# Patient Record
Sex: FEMALE | Race: WHITE | NOT HISPANIC OR LATINO | Employment: OTHER | URBAN - METROPOLITAN AREA
[De-identification: names, ages, dates, MRNs, and addresses within clinical notes are randomized per-mention and may not be internally consistent; named-entity substitution may affect disease eponyms.]

---

## 2017-07-21 ENCOUNTER — HOSPITAL ENCOUNTER (EMERGENCY)
Facility: MEDICAL CENTER | Age: 52
End: 2017-07-21
Attending: EMERGENCY MEDICINE
Payer: MEDICAID

## 2017-07-21 VITALS
BODY MASS INDEX: 21.78 KG/M2 | DIASTOLIC BLOOD PRESSURE: 73 MMHG | HEART RATE: 79 BPM | WEIGHT: 130.73 LBS | HEIGHT: 65 IN | RESPIRATION RATE: 16 BRPM | SYSTOLIC BLOOD PRESSURE: 113 MMHG | TEMPERATURE: 97.5 F | OXYGEN SATURATION: 97 %

## 2017-07-21 DIAGNOSIS — F15.10 METHAMPHETAMINE ABUSE (HCC): ICD-10-CM

## 2017-07-21 PROCEDURE — 99283 EMERGENCY DEPT VISIT LOW MDM: CPT

## 2017-07-21 ASSESSMENT — PAIN SCALES - GENERAL: PAINLEVEL_OUTOF10: 0

## 2017-07-21 NOTE — ED AVS SNAPSHOT
7/21/2017    Elissa Emmanuel  [address] [place] [address]    Dear Elissa:    [facility] wants to ensure your discharge home is safe and you or your loved ones have had all of your questions answered regarding your care after you leave the hospital.    Below is a list of resources and contact information should you have any questions regarding your hospital stay, follow-up instructions, or active medical symptoms.    Questions or Concerns Regarding… Contact   Medical Questions Related to Your Discharge  (7 days a week, 8am-5pm) Contact a Nurse Care Coordinator   [phone]   Medical Questions Not Related to Your Discharge  (24 hours a day / 7 days a week)  Contact the Nurse Health Line   [phone]    Medications or Discharge Instructions Refer to your discharge packet   or contact your [facility] Primary Care Provider   [phone]   Follow-up Appointment(s) Schedule your appointment via [employer]   or contact Scheduling [phone]   Billing Review your statement via [employer]  or contact Billing [phone]   Medical Records Review your records via [employer]   or contact Medical Records [phone]     You may receive a telephone call within two days of discharge. This call is to make certain you understand your discharge instructions and have the opportunity to have any questions answered. You can also easily access your medical information, test results and upcoming appointments via the [employer] free online health management tool. You can learn more and sign up at [email or web address]. For assistance setting up your [employer] account, please call [phone].    Once again, we want to ensure your discharge home is safe and that you have a clear understanding of any next steps in your care. If you have any questions or concerns, please do not hesitate to contact us, we are here for you. Thank you for choosing [facility] for your healthcare needs.    Sincerely,    Your [facility] Healthcare Team

## 2017-07-21 NOTE — ED PROVIDER NOTES
"ED Provider Note    Scribed for Nathen Sanchez M.D. by Adam Pollack. 7/21/2017  4:08 AM    Primary care provider: Pcp Pt States None  Means of arrival: walk in  History obtained from: patient and nurse  History limited by: none    CHIEF COMPLAINT  Chief Complaint   Patient presents with   • Detox       HPI  Elissa Emmanuel is a 52 y.o. female who presents to the Emergency Department for evaluation of drug use prior to transfer to detox program. Nurse informs that the patient arrived in the ED to obtain a bed at detox facility. Patient admitted to using methamphetamine prior to arrival in the ED. Patient denies suicidal ideation, homicidal ideation.     REVIEW OF SYSTEMS  Pertinent positives include drug use, detox.   Pertinent negatives include no suicidal ideation, homicidal ideation.    E.    PAST MEDICAL HISTORY   has a past medical history of Hypertension and Psychiatric disorder.    SURGICAL HISTORY  patient denies any surgical history    SOCIAL HISTORY  Social History   Substance Use Topics   • Smoking status: Never Smoker    • Smokeless tobacco: Never Used   • Alcohol Use: Yes      Comment: 2 drinks per day      History   Drug Use   • Yes     Comment: meth, marijuana       FAMILY HISTORY  History reviewed. No pertinent family history.    CURRENT MEDICATIONS  Home Medications     Reviewed by Drew Buchanan R.N. (Registered Nurse) on 07/21/17 at 0410  Med List Status: Complete    Medication Last Dose Status    diphenhydrAMINE (BENADRYL) 25 MG capsule 7/17/2016 Active                ALLERGIES  No Known Allergies    PHYSICAL EXAM  VITAL SIGNS: /73 mmHg  Pulse 101  Temp(Src) 36.1 °C (97 °F)  Resp 16  Ht 1.651 m (5' 5\")  Wt 59.3 kg (130 lb 11.7 oz)  BMI 21.76 kg/m2  SpO2 99%    Nursing note and vitals reviewed.  Constitutional: Well-developed and well-nourished. No distress. Pressured speech. increased psychomotor agitation consistent with methamphetamine intoxication.   HENT: Head is " normocephalic and atraumatic. Oropharynx is clear and moist without exudate or erythema.   Eyes: Pupils are equal, round, and reactive to light. Conjunctiva are normal.   Cardiovascular: Tachycardic rate and regular rhythm. No murmur heard. Normal radial pulses.  Pulmonary/Chest: Breath sounds normal. No wheezes or rales.   Abdominal: Soft and non-tender. No distention    Musculoskeletal: Extremities exhibit normal range of motion without edema or tenderness.   Neurological: Awake, alert and oriented to person, place, and time. No focal deficits noted.  Skin: Skin is warm and dry. No rash.   Psychiatric: Pressured speech. increased psychomotor agitation consistent with methamphetamine intoxication.     DIAGNOSTIC STUDIES / PROCEDURES    COURSE & MEDICAL DECISION MAKING  Nursing notes, VS, PMSFHx reviewed in chart.     Review of past medical records shows the patient was last seen in the ED 12/2016 for abdominal pain, UTI, and methamphetamine use.     4:08 AM - Social Work informs that she has been placed in the lounge for comfort. Patient seen and examined in the lounge. She will be allowed to remain in the lounge as long as she is not a problem. Plan confirmed with charge nurse. Social work will attempt to obtain a bed during normal business hours secondary to the patient's benign nature and lack of medical complaints. The differential diagnoses include but are not limited to: methamphetamine abuse     6:16 AM - Patient transferred to Room 24.     9:29 AM - Patient has been accepted at [facility] who will also arrange for transportation.       The patient will return for new or worsening symptoms and is stable at the time of discharge.    DISPOSITION:  Patient will be discharged home in stable condition.    FOLLOW UP:  [facility], Emergency Dept  [address] [facility] [address] [phone]    If symptoms worsen      OUTPATIENT MEDICATIONS:  New Prescriptions    No medications on file            FINAL IMPRESSION  1. Methamphetamine abuse          IAdam (Scribe), am scribing for, and in the presence of, Nathen Sanchez M.D..    Electronically signed by: Adam Pollack (Scribe), 7/21/2017    Nathen GUAJARDO M.D. personally performed the services described in this documentation, as scribed by Adam Pollack in my presence, and it is both accurate and complete.    The note accurately reflects work and decisions made by me.  Nathen Sanchez  7/21/2017  11:04 AM

## 2017-07-21 NOTE — ED NOTES
Pt provided with box meal and coffee.  [facility] states they are going to accept pt and arrange for transportation.

## 2017-07-21 NOTE — ED NOTES
Pt provided with discharge instructions, instructions to establish care with PCP, s/s of when to seek emergency care.  Pt verbalizes understanding.  Pt discharged in good condition with [facility].

## 2017-07-21 NOTE — ED NOTES
Elissa Emmanuel  52 y.o.  female  Chief Complaint   Patient presents with   • Detox     Present to triage c/o detox to alcohol. Last drink yesterday. No tremors noted.

## 2017-07-21 NOTE — ED AVS SNAPSHOT
Wisembly Access Code: [email or web address]  Expires: 8/20/2017  8:58 AM    Your email address is not on file at [employer].  Email Addresses are required for you to sign up for Wisembly, please contact [phone] to verify your personal information and to provide your email address prior to attempting to register for Wisembly.    Elissa Emmanuel  [address] [place] [address]    Wisembly  A secure, online tool to manage your health information     [employer]’s Wisembly® is a secure, online tool that connects you to your personalized health information from the privacy of your home -- day or night - making it very easy for you to manage your healthcare. Once the activation process is completed, you can even access your medical information using the Wisembly chloe, which is available for free in the Apple Chloe store or Google Play store.     To learn more about Wisembly, visit [email or web address]    There are two levels of access available (as shown below):   My Chart Features  [facility] Primary Care Doctor [facility]  Specialists [facility]  Urgent  Care [facility] Primary Care Doctor   Email your healthcare team securely and privately 24/7 X X X    Manage appointments: schedule your next appointment; view details of past/upcoming appointments X      Request prescription refills. X      View recent personal medical records, including lab and immunizations X X X X   View health record, including health history, allergies, medications X X X X   Read reports about your outpatient visits, procedures, consult and ER notes X X X X   See your discharge summary, which is a recap of your hospital and/or ER visit that includes your diagnosis, lab results, and care plan X X  X     How to register for Holairat:  Once your e-mail address has been verified, follow the following steps to sign up for Wisembly.     1. Go to  [email or web address]  2. Click on the Sign Up Now box, which takes you to the New Member Sign Up page. You will  need to provide the following information:  a. Enter your Panopto Access Code exactly as it appears at the top of this page. (You will not need to use this code after you’ve completed the sign-up process. If you do not sign up before the expiration date, you must request a new code.)   b. Enter your date of birth.   c. Enter your home email address.   d. Click Submit, and follow the next screen’s instructions.  3. Create a [employer] Globalt ID. This will be your Panopto login ID and cannot be changed, so think of one that is secure and easy to remember.  4. Create a Panopto password. You can change your password at any time.  5. Enter your Password Reset Question and Answer. This can be used at a later time if you forget your password.   6. Enter your e-mail address. This allows you to receive e-mail notifications when new information is available in Panopto.  7. Click Sign Up. You can now view your health information.    For assistance activating your Panopto account, call [phone]

## 2017-07-21 NOTE — ED NOTES
Seen by ERP. Patient ok to stay in [facility] at this time. Referrals given by alert team.

## 2017-07-21 NOTE — ED AVS SNAPSHOT
Home Care Instructions                                                                                                                Elissa Emmanuel   MRN: [id number]    Department:  [facility], Emergency Dept   Date of Visit:  7/21/2017            [facility], Emergency Dept    [address] [place] [address]    Phone:  [phone]      You were seen by     Nathen Sanchez M.D.      Your Diagnosis Was     Methamphetamine abuse     F15.10       Follow-up Information     1. Follow up with [facility], Emergency Dept.    Specialty:  Emergency Medicine    Why:  If symptoms worsen    Contact information    [address] [place] [address] [phone]      Medication Information     Review all of your home medications and newly ordered medications with your primary doctor and/or pharmacist as soon as possible. Follow medication instructions as directed by your doctor and/or pharmacist.     Please keep your complete medication list with you and share with your physician. Update the information when medications are discontinued, doses are changed, or new medications (including over-the-counter products) are added; and carry medication information at all times in the event of emergency situations.               Medication List      ASK your doctor about these medications        Instructions    Morning Afternoon Evening Bedtime    diphenhydrAMINE 25 MG capsule   Commonly known as:  BENADRYL        Take 1 Cap by mouth every 6 hours as needed.   Dose:  25 mg                                  Discharge Instructions       Stimulant Use Disorder-Methamphetamines  Methamphetamines are one of a group of powerful drugs known as stimulants. Methamphetamine is a form of amphetamine. It is rarely used medically but is often misused because of the effects it produces. These effects include:  · A feeling of extreme pleasure (euphoria).  · Alertness.  · High  "energy.  · Increased sexuality.  Common street names for methamphetamine are meth, crystal, ice, glass, and chalk. This drug can be taken by mouth, smoked, snorted, or dissolved in water and injected.  Stimulants are addictive because they activate regions of the brain that are responsible for producing both the pleasurable sensation of \"reward\" and psychological dependence. Together, these actions account for loss of control and the rapid development of drug dependence. This means the user will become ill without the drug (withdrawal) and will need to keep using it to function.   Stimulant use disorder is use of stimulants that disrupts your daily life. It disrupts relationships with family and friends and how you do your job. Methamphetamine increases blood pressure and heart rate. Use can cause a heart attack or stroke. Methamphetamine can also cause death from irregular heart rate or seizures.  SIGNS AND SYMPTOMS   Signs and symptoms of stimulant use disorder with methamphetamine include:  · Use of methamphetamines in larger amounts or over a longer period than intended.  · Unsuccessful attempts to cut down or control methamphetamine use.  · A lot of time spent obtaining, using, or recovering from the effects of methamphetamines.  · A strong desire or urge to use methamphetamines (craving).  · Continued use of methamphetamines in spite of major problems at work, school, or home because of use.  · Continued use of methamphetamines in spite of relationship problems because of use.  · Giving up or cutting down on important life activities because of use of methamphetamines.  · Use of methamphetamines over and over in situations when it is physically hazardous, such as driving a car.  · Continued use of methamphetamines in spite of a physical problem that is likely related to use. Physical problems can include:  ¨ Extreme weight loss.  ¨ Malnutrition.  ¨ Jaw clenching.  ¨ Severe dental problems (meth mouth).  ¨ Lung " problems (due to smoking).  ¨ Skin sores (due to scratching).  ¨ Infections such as human immunodeficiency virus (HIV) and hepatitis (from injecting methamphetamines).  · Continued use of methamphetamines in spite of a mental problem that is likely related to use. Mental problems can include:  ¨ Memory problems.  ¨ Schizophrenia-like symptoms.  ¨ Depression.  ¨ Bipolar mood swings.  ¨ Violent behavior.  ¨ Anxiety.  ¨ Sleep problems.  · Need to use more and more methamphetamines to get the same effect, or lessened effect over time with use of the same amount (tolerance).  · Having withdrawal symptoms when methamphetamine use is stopped, or using methamphetamines to reduce or avoid withdrawal symptoms. Withdrawal symptoms include:  ¨ Depressed or irritable mood.  ¨ Low energy or restlessness.  ¨ Bad dreams.  ¨ Too little or too much sleep.  ¨ Increased appetite.  DIAGNOSIS  Stimulant use disorder is diagnosed by your health care provider. You may be asked questions about your methamphetamine use and how it affects your life. A physical exam may be done. A drug screen may be ordered. You may be referred to a mental health professional. The diagnosis of stimulant use disorder requires at least two symptoms within 12 months. The type of stimulant use disorder depends on the number of symptoms you have. The type may be:  · Mild. Two or three signs and symptoms.  · Moderate. Four or five signs and symptoms.  · Severe. Six or more signs and symptoms.  TREATMENT   There are two types of treatment:   · Short-term (urgent) medical treatment. This helps to preserve life and prevent or minimize damage from the physical or mental problems related to methamphetamine use.    · Long-term substance abuse treatment. This focuses on recovery from use disorder. It is provided by mental health professionals who have training in substance use disorders. It is usually a combination of counseling, support groups, and nonaddictive medicines  that can reduce cravings or block the effects of methamphetamine.  HOME CARE INSTRUCTIONS   · Take medicines only as directed by your health care provider.  · Identify the people and activities that trigger your methamphetamine use and avoid them.  · Keep all follow-up visits as directed by your health care provider.  · Brush and floss your teeth every day. Do this 2 times a day if you can. Also use an antibacterial mouthwash.  · Avoid sugary drinks.  · Do not smoke.  SEEK MEDICAL CARE IF:  · Your symptoms get worse or you relapse.  · You are not able to take medicines as directed.    SEEK IMMEDIATE MEDICAL CARE IF:   · You have serious thoughts about hurting yourself or others.  · You have a seizure, chest pain, sudden weakness, or loss of speech or vision.  FOR MORE INFORMATION  · National [place] on Drug Abuse: www.drugabuse.gov  · Substance Abuse and Mental Health Services Administration: www.samhsa.gov     This information is not intended to replace advice given to you by your health care provider. Make sure you discuss any questions you have with your health care provider.     Document Released: 08/23/2005 Document Revised: 01/08/2016 Document Reviewed: 12/31/2014  Elsevier Interactive Patient Education ©2016 [employer]            Patient Information     Patient Information    Following emergency treatment: all patient requiring follow-up care must return either to a private physician or a clinic if your condition worsens before you are able to obtain further medical attention, please return to the emergency room.     Billing Information    [facility] we work to make the billing process streamlined for our patients.  Our Representatives are here to answer any questions you may have regarding your hospital bill.  If you have insurance coverage and have supplied your insurance information to us, we will submit a claim to your insurer on your behalf.  Should you have any questions regarding your bill,  we can be reached online or by phone as follows:  Online: You are able pay your bills online or live chat with our representatives about any billing questions you may have. We are here to help Monday - Friday from 8:00am to 7:30pm and 9:00am - 12:00pm on Saturdays.  Please visit [email or web address]  for more information.   Phone:  [phone] or [phone]    Please note that your emergency physician, surgeon, pathologist, radiologist, anesthesiologist, and other specialists are not employed by [facility] and will therefore bill separately for their services.  Please contact them directly for any questions concerning their bills at the numbers below:     Emergency Physician Services:  [phone]  [facility]:  [phone]  [facility]:  [phone]  [facility]:  [phone]    1. Your final bill may vary from the amount quoted upon discharge if all procedures are not complete at that time, or if your doctor has additional procedures of which we are not aware. You will receive an additional bill if you return to the Emergency Department at [facility] for suture removal regardless of the facility of which the sutures were placed.     2. Please arrange for settlement of this account at the emergency registration.    3. All self-pay accounts are due in full at the time of treatment.  If you are unable to meet this obligation then payment is expected within 4-5 days.     4. If you have had radiology studies (CT, X-ray, Ultrasound, MRI), you have received a preliminary result during your emergency department visit. Please contact the radiology department [phone] to receive a copy of your final result. Please discuss the Final result with your primary physician or with the follow up physician provided.     Crisis Hotline:  [place] Crisis Hotline:  7-707-KJFARIA or 1-333.351.8752  [place] Crisis Hotline:    1-296.410.3862 or  173.401.9738         ED Discharge Follow Up Questions    1. In order to provide you with very good care, we would like to follow up with a phone call in the next few days.  May we have your permission to contact you?     YES /  NO    2. What is the best phone number to call you? (       )_____-__________    3. What is the best time to call you?      Morning  /  Afternoon  /  Evening                   Patient Signature:  ____________________________________________________________    Date:  ____________________________________________________________      Your appointments     Sep 28, 2017  1:00 PM   New Patient with Daniel Ku M.D.   [facility] ([address])    [address] [place] [address], [address]  [facility] [address]   [phone]

## 2017-07-21 NOTE — DISCHARGE PLANNING
52y female presents with co of etoh and meth abuse. She denies any si, hi or active psychosis. However she appears to under the influence of meth with pressured, tangential speech, hypo manic behavior. [facility] but beds are full at this time. plan to observe and monitor [facility] either [facility]( [facility]) or [facility] intervention this am.

## 2017-07-21 NOTE — ED NOTES
Assumed care of pt. Per Dr. Sanchez per is ok to rest for now, ok to not be monitored at this time, [facility] is arranging for pt to go to [facility] in morning .

## 2018-05-04 ENCOUNTER — HOSPITAL ENCOUNTER (EMERGENCY)
Facility: MEDICAL CENTER | Age: 53
End: 2018-05-04
Attending: EMERGENCY MEDICINE
Payer: MEDICAID

## 2018-05-04 VITALS
TEMPERATURE: 98.4 F | HEIGHT: 63 IN | HEART RATE: 77 BPM | SYSTOLIC BLOOD PRESSURE: 125 MMHG | DIASTOLIC BLOOD PRESSURE: 87 MMHG | OXYGEN SATURATION: 98 % | WEIGHT: 130 LBS | RESPIRATION RATE: 14 BRPM | BODY MASS INDEX: 23.04 KG/M2

## 2018-05-04 DIAGNOSIS — F10.10 ALCOHOL ABUSE: ICD-10-CM

## 2018-05-04 DIAGNOSIS — F15.10 METHAMPHETAMINE ABUSE (HCC): ICD-10-CM

## 2018-05-04 PROCEDURE — 99284 EMERGENCY DEPT VISIT MOD MDM: CPT

## 2018-05-04 NOTE — ED TRIAGE NOTES
"Chief Complaint   Patient presents with   • Detox     from Alcohol and Meth     Last use yesterday. Pt wants to use [facility].   /87   Pulse 90   Temp 37 °C (98.6 °F)   Resp 14   Ht 1.6 m (5' 3\")   Wt 59 kg (130 lb)   SpO2 98%   BMI 23.03 kg/m²   Pt placed back in lobby, educated on triage process, and told to inform staff of any change in condition.     "

## 2018-05-04 NOTE — ED PROVIDER NOTES
"ED Provider Note    CHIEF COMPLAINT  Chief Complaint   Patient presents with   • Detox     from Alcohol and Meth       [facility]  Elissa Emmanuel is a 53 y.o. female who presents to the ER requesting referral to a detox center for methamphetamine and alcohol.  The patient states she's been abusing methamphetamine and alcohol.  She's been doing so off and on for years.  She does IV methamphetamine.  She is having some problem with the courts and wants to be referred to an inpatient detox center.    She denies any acute medical problems.  This time.  No chest pain or cough, shortness of breath, fevers or chills.  Does not feel sick.  Denies any suicidal ideation.  Denies any homicidal ideation.  States she gets angry from time to time.  Otherwise any other acute concerns or complaints.    REVIEW OF SYSTEMS  See HPI for further details. All other systems are negative.    PAST MEDICAL HISTORY  Past Medical History:   Diagnosis Date   • Hypertension    • Psychiatric disorder        FAMILY HISTORY  No family history on file.    SOCIAL HISTORY  Social History     Social History   • Marital status: Single     Spouse name: N/A   • Number of children: N/A   • Years of education: N/A     Social History Main Topics   • Smoking status: Never Smoker   • Smokeless tobacco: Never Used   • Alcohol use Yes      Comment: 2 drinks per day   • Drug use: Yes      Comment: meth, marijuana   • Sexual activity: Not on file     Other Topics Concern   • Not on file     Social History Narrative   • No narrative on file       SURGICAL HISTORY  No past surgical history on file.    CURRENT MEDICATIONS  Home Medications    **Home medications have not yet been reviewed for this encounter**     Aches, over-the-counter allergy medicines including Benadryl.    ALLERGIES  No Known Allergies    PHYSICAL EXAM  VITAL SIGNS: /87   Pulse 90   Temp 37 °C (98.6 °F)   Resp 14   Ht 1.6 m (5' 3\")   Wt 59 kg (130 lb)   SpO2 98%   BMI 23.03 kg/m²  "   Constitutional: Somnolent but wakes up easily.  Answers questions and follows commands.  Very disorganized.    HENT: Normocephalic, Atraumatic, Bilateral external ears normal, Oropharynx moist, No oral exudates, Nose normal.   Eyes: PERRL, EOMI, Conjunctiva normal, No discharge.     Cardiovascular: Normal heart rate, Normal rhythm, No murmurs, No rubs, No gallops.   Thorax & Lungs: Normal breath sounds, No respiratory distress, No wheezing  Abdomen: Bowel sounds normal, Soft, No tenderness  Skin: Warm, Dry, No erythema, No rash.   Musculoskeletal: Good range of motion in all major joints.   Neurologic: Alert, No focal deficits noted.   Psychiatric: Affect normal, although she is disoriented.  She denies suicidal or homicidal ideation.  She has a difficult time sitting still and she is very non-directable.  I think this is sequelae of chronic methamphetamine abuse.    COURSE & MEDICAL DECISION MAKING  Pertinent Labs & Imaging studies reviewed. (See chart for details)  Patient presents requesting help for detox.  I had social work see her.  She does not meet any criteria to be held against her will.  She is not disoriented.  She is afebrile.  Although she is abusing substances and has chronic sequelae of methamphetamine abuse, she does not appear particularly disorganized.  She has no medical problems or complaints.  She is not suicidal.     .   has provided her with resources.  She seems stable for discharge.  She is counseled to stop using methamphetamine.    The patient was noted to have elevated blood pressure while in the ER and was counseled to see their doctor within one wee to have this rechecked.      [address] [address] [phone]  Schedule an appointment as soon as possible for a visit in 2 days        FINAL IMPRESSION  1. Alcohol abuse    2. Methamphetamine abuse          2.   3.         Electronically signed by: Jozef Centeno  5/4/2018 11:49 AM

## 2018-05-04 NOTE — DISCHARGE PLANNING
Medical Social Work    MSW spoke to pt regarding options for detox. At this due to her insurance, no place can accept pt for detox. MSW called and spoke with [facility] [place]. They are full. [facility] does not take pt's insurance and [facility] has closed.     Pt started mumbling about other places she wants to go. Pt stated he was at [facility] and has a  she contacts. MSW tried to redirect pt again. Pt continues to to have scattered thoughts and MSW was unable to interview pt appropriately.    MSW can come back and speak with pt when she has cleared a little more. MSW left resources in pt's chart for substance abuse and [facility] to follow-up with.

## 2018-05-04 NOTE — ED NOTES
Pt self ambulatory to room. Agree with triage RN assessment.     [place] 1 Fall Risk Assessment completed and in patient chart. Appropriate interventions in place.

## 2018-05-04 NOTE — ED NOTES
Discharge instructions given. All questions answered. [facility] with [facility]. Pt verbalized understanding.

## 2018-10-25 ENCOUNTER — APPOINTMENT (OUTPATIENT)
Dept: RADIOLOGY | Facility: MEDICAL CENTER | Age: 53
End: 2018-10-25
Attending: PHYSICIAN ASSISTANT
Payer: MEDICAID

## 2018-12-05 ENCOUNTER — HOSPITAL ENCOUNTER (EMERGENCY)
Facility: MEDICAL CENTER | Age: 53
End: 2018-12-05
Attending: EMERGENCY MEDICINE
Payer: MEDICAID

## 2018-12-05 VITALS
SYSTOLIC BLOOD PRESSURE: 134 MMHG | HEART RATE: 80 BPM | WEIGHT: 160.94 LBS | RESPIRATION RATE: 18 BRPM | BODY MASS INDEX: 25.86 KG/M2 | HEIGHT: 66 IN | DIASTOLIC BLOOD PRESSURE: 85 MMHG | OXYGEN SATURATION: 97 % | TEMPERATURE: 97.5 F

## 2018-12-05 DIAGNOSIS — F15.10 METHAMPHETAMINE ABUSE (HCC): ICD-10-CM

## 2018-12-05 DIAGNOSIS — L30.9 DERMATITIS: ICD-10-CM

## 2018-12-05 PROCEDURE — 700102 HCHG RX REV CODE 250 W/ 637 OVERRIDE(OP): Performed by: EMERGENCY MEDICINE

## 2018-12-05 PROCEDURE — 99284 EMERGENCY DEPT VISIT MOD MDM: CPT

## 2018-12-05 PROCEDURE — 99283 EMERGENCY DEPT VISIT LOW MDM: CPT

## 2018-12-05 PROCEDURE — A9270 NON-COVERED ITEM OR SERVICE: HCPCS | Performed by: EMERGENCY MEDICINE

## 2018-12-05 RX ORDER — DIPHENHYDRAMINE HCL 25 MG
50 TABLET ORAL ONCE
Status: COMPLETED | OUTPATIENT
Start: 2018-12-05 | End: 2018-12-05

## 2018-12-05 RX ADMIN — DIPHENHYDRAMINE HCL 50 MG: 25 TABLET ORAL at 22:10

## 2018-12-05 ASSESSMENT — PAIN SCALES - GENERAL: PAINLEVEL_OUTOF10: 0

## 2018-12-06 ENCOUNTER — HOSPITAL ENCOUNTER (OUTPATIENT)
Dept: HOSPITAL 8 - ED | Age: 53
Setting detail: OBSERVATION
LOS: 1 days | Discharge: PSYCHIATRIC OR SUBSTANCE USE FACILITY | End: 2018-12-07
Attending: INTERNAL MEDICINE | Admitting: INTERNAL MEDICINE
Payer: MEDICAID

## 2018-12-06 VITALS — DIASTOLIC BLOOD PRESSURE: 74 MMHG | SYSTOLIC BLOOD PRESSURE: 123 MMHG

## 2018-12-06 VITALS — BODY MASS INDEX: 26.88 KG/M2 | HEIGHT: 65 IN | WEIGHT: 161.33 LBS

## 2018-12-06 DIAGNOSIS — F29: ICD-10-CM

## 2018-12-06 DIAGNOSIS — Z91.83: ICD-10-CM

## 2018-12-06 DIAGNOSIS — R45.850: Primary | ICD-10-CM

## 2018-12-06 DIAGNOSIS — R45.851: ICD-10-CM

## 2018-12-06 DIAGNOSIS — J44.9: ICD-10-CM

## 2018-12-06 DIAGNOSIS — Z59.0: ICD-10-CM

## 2018-12-06 LAB
ALBUMIN SERPL-MCNC: 3.4 G/DL (ref 3.4–5)
ALP SERPL-CCNC: 112 U/L (ref 45–117)
ALT SERPL-CCNC: 48 U/L (ref 12–78)
ANION GAP SERPL CALC-SCNC: 8 MMOL/L (ref 5–15)
APAP SERPL-MCNC: < 2 MCG/ML (ref 10–30)
BASOPHILS # BLD AUTO: 0.02 X10^3/UL (ref 0–0.1)
BASOPHILS NFR BLD AUTO: 0 % (ref 0–1)
BILIRUB SERPL-MCNC: 0.5 MG/DL (ref 0.2–1)
CALCIUM SERPL-MCNC: 8 MG/DL (ref 8.5–10.1)
CHLORIDE SERPL-SCNC: 108 MMOL/L (ref 98–107)
CREAT SERPL-MCNC: 0.63 MG/DL (ref 0.55–1.02)
EOSINOPHIL # BLD AUTO: 0.21 X10^3/UL (ref 0–0.4)
EOSINOPHIL NFR BLD AUTO: 3 % (ref 1–7)
ERYTHROCYTE [DISTWIDTH] IN BLOOD BY AUTOMATED COUNT: 13.9 % (ref 9.6–15.2)
LYMPHOCYTES # BLD AUTO: 2.38 X10^3/UL (ref 1–3.4)
LYMPHOCYTES NFR BLD AUTO: 38 % (ref 22–44)
MCH RBC QN AUTO: 27.2 PG (ref 27–34.8)
MCHC RBC AUTO-ENTMCNC: 33.5 G/DL (ref 32.4–35.8)
MCV RBC AUTO: 81.3 FL (ref 80–100)
MD: NO
MONOCYTES # BLD AUTO: 0.68 X10^3/UL (ref 0.2–0.8)
MONOCYTES NFR BLD AUTO: 11 % (ref 2–9)
NEUTROPHILS # BLD AUTO: 2.93 X10^3/UL (ref 1.8–6.8)
NEUTROPHILS NFR BLD AUTO: 47 % (ref 42–75)
PLATELET # BLD AUTO: 241 X10^3/UL (ref 130–400)
PMV BLD AUTO: 6.9 FL (ref 7.4–10.4)
PROT SERPL-MCNC: 6.8 G/DL (ref 6.4–8.2)
RBC # BLD AUTO: 4.25 X10^6/UL (ref 3.82–5.3)
VANCOMYCIN TROUGH SERPL-MCNC: < 1.7 MG/DL (ref 2.8–20)

## 2018-12-06 PROCEDURE — 99284 EMERGENCY DEPT VISIT MOD MDM: CPT

## 2018-12-06 PROCEDURE — 36415 COLL VENOUS BLD VENIPUNCTURE: CPT

## 2018-12-06 PROCEDURE — 80053 COMPREHEN METABOLIC PANEL: CPT

## 2018-12-06 PROCEDURE — G0378 HOSPITAL OBSERVATION PER HR: HCPCS

## 2018-12-06 PROCEDURE — 80329 ANALGESICS NON-OPIOID 1 OR 2: CPT

## 2018-12-06 PROCEDURE — 80307 DRUG TEST PRSMV CHEM ANLYZR: CPT

## 2018-12-06 PROCEDURE — G0480 DRUG TEST DEF 1-7 CLASSES: HCPCS

## 2018-12-06 PROCEDURE — 84703 CHORIONIC GONADOTROPIN ASSAY: CPT

## 2018-12-06 PROCEDURE — 85025 COMPLETE CBC W/AUTO DIFF WBC: CPT

## 2018-12-06 SDOH — ECONOMIC STABILITY - HOUSING INSECURITY: HOMELESSNESS: Z59.0

## 2018-12-06 NOTE — ED TRIAGE NOTES
"Chief Complaint   Patient presents with   • Rash     Pt ambulatory to triage with above complaint.  Pt small rash on back.  Pt states she has lost her medications last week and needs them.  Pt speaking tangentially and rambling.  Pt states she was 31 days sober and smoked meth Sunday and Monday.  Pt unable to sit still or answer questions clearly in triage.  Pt states \"I think I need a breathing treatment.\" Pt speaking continually in triage.     Pt returned to lobby, educated on triage process, and to inform staff of any changes or concerns.    Blood pressure 134/85, pulse (!) 58, temperature 36.4 °C (97.5 °F), temperature source Temporal, resp. rate 18, height 1.676 m (5' 6\"), weight 73 kg (160 lb 15 oz), SpO2 97 %.      "

## 2018-12-06 NOTE — ED PROVIDER NOTES
"ED Provider Note    CHIEF COMPLAINT  Chief Complaint   Patient presents with   • Rash       HPI  Elissa Emmanuel is a 53 y.o. female who presents with a report of having a rash with itching for the last few days.  She does have a history of psychiatric disorder secondary to methamphetamine abuse.  She presents appearing that she may be intoxicated on methamphetamine as well.  Denies any fever, chills, sweats, bedbugs, recent exposure to scabies, immunocompromise state    REVIEW OF SYSTEMS  See HPI for further details. All other systems are negative.  Patient is a poor historian    PAST MEDICAL HISTORY  Past Medical History:   Diagnosis Date   • Hypertension    • Psychiatric disorder        FAMILY HISTORY  History reviewed. No pertinent family history.    SOCIAL HISTORY   reports that she has been smoking.  She has been smoking about 0.50 packs per day. She has never used smokeless tobacco. She reports that she drinks alcohol. She reports that she uses drugs.    SURGICAL HISTORY  History reviewed. No pertinent surgical history.    CURRENT MEDICATIONS  Home Medications     Reviewed by Prem Bhatti R.N. (Registered Nurse) on 12/05/18 at [facility]  Med List Status: Not Addressed   Medication Last Dose Status        Patient Philip Taking any Medications                       ALLERGIES  No Known Allergies    PHYSICAL EXAM  VITAL SIGNS: /85   Pulse (!) 58   Temp 36.4 °C (97.5 °F) (Temporal)   Resp 18   Ht 1.676 m (5' 6\")   Wt 73 kg (160 lb 15 oz)   SpO2 97%   BMI 25.98 kg/m²    Constitutional: Well developed, Well nourished, No acute distress, Non-toxic appearance.   HENT: Normocephalic, Atraumatic, Bilateral external ears normal, Oropharynx is clear mucous membranes are moist. No oral exudates or nasal discharge.   Eyes: Pupils are equal round and reactive, EOMI, Conjunctiva normal, No discharge.   Neck: Normal range of motion, No tenderness, Supple, No stridor. No meningismus.  Lymphatic: No " lymphadenopathy noted.   Cardiovascular: Regular rate and rhythm without murmur rub or gallop.  Thorax & Lungs: Clear breath sounds bilaterally without wheezes, rhonchi or rales. There is no chest wall tenderness.   Abdomen: Soft non-tender non-distended. There is no rebound or guarding. No organomegaly is appreciated. Bowel sounds are normal.  Skin: Minimal maculopapular rash to lower back around to the flank and lower belly bilaterally with no evidence of involvement of the neck, face, scalp, palms or soles or lower extremities except buttocks on the right side.  This is non-confluent with no vesicles or pustules  Back: No CVA or spinal tenderness.   Extremities: Intact distal pulses, No edema, No tenderness, No cyanosis, No clubbing. Capillary refill is less than 2 seconds.  Musculoskeletal: Good range of motion in all major joints. No tenderness to palpation or major deformities noted.   Neurologic: Alert & oriented x 3, Normal motor function, Normal sensory function, No focal deficits noted. Reflexes are normal.  Psychiatric: Affect normal, Judgment normal, Mood elevated. There is no suicidal ideation or patient reported hallucinations.  Patient has some tangential thought with pressured speech      COURSE & MEDICAL DECISION MAKING  Pertinent Labs & Imaging studies reviewed. 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  She is encouraged to use Benadryl as needed and she is given 50 mg of Benadryl in the emergency department.  She is also encouraged to stop using methamphetamine and will return if any significant change in symptoms but there does not appear to be any significant infectious etiology here    FINAL IMPRESSION  1. Dermatitis    2. Methamphetamine abuse (HCC)             Electronically signed by: Charles Mendez, 12/5/2018 9:54 PM

## 2019-04-23 ENCOUNTER — HOSPITAL ENCOUNTER (EMERGENCY)
Dept: HOSPITAL 8 - ED | Age: 54
Discharge: HOME | End: 2019-04-23
Payer: MEDICAID

## 2019-04-23 VITALS — SYSTOLIC BLOOD PRESSURE: 134 MMHG | DIASTOLIC BLOOD PRESSURE: 75 MMHG

## 2019-04-23 VITALS — HEIGHT: 68 IN | WEIGHT: 180.78 LBS | BODY MASS INDEX: 27.4 KG/M2

## 2019-04-23 DIAGNOSIS — S80.01XA: ICD-10-CM
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
## 2019-04-23 DIAGNOSIS — S80.02XA: Primary | ICD-10-CM
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
## 2019-04-23 PROCEDURE — 99283 EMERGENCY DEPT VISIT LOW MDM: CPT

## 2019-05-16 ENCOUNTER — HOSPITAL ENCOUNTER (EMERGENCY)
Dept: HOSPITAL 8 - ED | Age: 54
Discharge: HOME | End: 2019-05-16
Payer: MEDICAID

## 2019-05-16 VITALS — DIASTOLIC BLOOD PRESSURE: 71 MMHG | SYSTOLIC BLOOD PRESSURE: 118 MMHG

## 2019-05-16 VITALS — BODY MASS INDEX: 27.53 KG/M2 | WEIGHT: 171.3 LBS | HEIGHT: 66 IN

## 2019-05-16 DIAGNOSIS — J44.9: ICD-10-CM

## 2019-05-16 DIAGNOSIS — Z95.0: ICD-10-CM

## 2019-05-16 DIAGNOSIS — J37.0: Primary | ICD-10-CM

## 2019-05-16 PROCEDURE — 99281 EMR DPT VST MAYX REQ PHY/QHP: CPT

## 2019-08-10 ENCOUNTER — HOSPITAL ENCOUNTER (EMERGENCY)
Dept: HOSPITAL 8 - ED | Age: 54
Discharge: HOME | End: 2019-08-10
Payer: MEDICAID

## 2019-08-10 VITALS — SYSTOLIC BLOOD PRESSURE: 126 MMHG | DIASTOLIC BLOOD PRESSURE: 79 MMHG

## 2019-08-10 DIAGNOSIS — Z72.9: ICD-10-CM

## 2019-08-10 DIAGNOSIS — L03.113: ICD-10-CM

## 2019-08-10 DIAGNOSIS — J44.9: ICD-10-CM

## 2019-08-10 DIAGNOSIS — F17.200: ICD-10-CM

## 2019-08-10 DIAGNOSIS — L50.0: Primary | ICD-10-CM

## 2019-08-10 PROCEDURE — 99283 EMERGENCY DEPT VISIT LOW MDM: CPT

## 2021-02-22 ENCOUNTER — HOSPITAL ENCOUNTER (INPATIENT)
Dept: HOSPITAL 8 - 3E | Age: 56
LOS: 6 days | Discharge: HOME | DRG: 885 | End: 2021-02-28
Attending: PSYCHIATRY & NEUROLOGY | Admitting: PSYCHIATRY & NEUROLOGY
Payer: MEDICAID

## 2021-02-22 ENCOUNTER — HOSPITAL ENCOUNTER (EMERGENCY)
Dept: HOSPITAL 8 - ED | Age: 56
Discharge: OTHER INSTITUTION | End: 2021-02-22
Payer: MEDICAID

## 2021-02-22 VITALS — HEIGHT: 65 IN | BODY MASS INDEX: 23.88 KG/M2 | WEIGHT: 143.3 LBS

## 2021-02-22 VITALS — SYSTOLIC BLOOD PRESSURE: 125 MMHG | DIASTOLIC BLOOD PRESSURE: 77 MMHG

## 2021-02-22 VITALS — SYSTOLIC BLOOD PRESSURE: 109 MMHG | DIASTOLIC BLOOD PRESSURE: 65 MMHG

## 2021-02-22 VITALS — BODY MASS INDEX: 24.46 KG/M2 | WEIGHT: 143.3 LBS | HEIGHT: 64 IN

## 2021-02-22 DIAGNOSIS — Z20.822: ICD-10-CM

## 2021-02-22 DIAGNOSIS — Z79.899: ICD-10-CM

## 2021-02-22 DIAGNOSIS — F31.2: Primary | ICD-10-CM

## 2021-02-22 DIAGNOSIS — Z88.2: ICD-10-CM

## 2021-02-22 DIAGNOSIS — F23: ICD-10-CM

## 2021-02-22 DIAGNOSIS — F44.9: Primary | ICD-10-CM

## 2021-02-22 DIAGNOSIS — F15.229: ICD-10-CM

## 2021-02-22 LAB
ALBUMIN SERPL-MCNC: 3.8 G/DL (ref 3.4–5)
ANION GAP SERPL CALC-SCNC: 7 MMOL/L (ref 5–15)
BASOPHILS # BLD AUTO: 0.1 X10^3/UL (ref 0–0.1)
BASOPHILS NFR BLD AUTO: 1 % (ref 0–1)
CALCIUM SERPL-MCNC: 8.7 MG/DL (ref 8.5–10.1)
CHLORIDE SERPL-SCNC: 108 MMOL/L (ref 98–107)
CREAT SERPL-MCNC: 1 MG/DL (ref 0.55–1.02)
EOSINOPHIL # BLD AUTO: 0.1 X10^3/UL (ref 0–0.4)
EOSINOPHIL NFR BLD AUTO: 2 % (ref 1–7)
ERYTHROCYTE [DISTWIDTH] IN BLOOD BY AUTOMATED COUNT: 14.1 % (ref 9.6–15.2)
LYMPHOCYTES # BLD AUTO: 2 X10^3/UL (ref 1–3.4)
LYMPHOCYTES NFR BLD AUTO: 30 % (ref 22–44)
MCH RBC QN AUTO: 27.2 PG (ref 27–34.8)
MCHC RBC AUTO-ENTMCNC: 33 G/DL (ref 32.4–35.8)
MD: NO
MONOCYTES # BLD AUTO: 0.5 X10^3/UL (ref 0.2–0.8)
MONOCYTES NFR BLD AUTO: 7 % (ref 2–9)
NEUTROPHILS # BLD AUTO: 4.1 X10^3/UL (ref 1.8–6.8)
NEUTROPHILS NFR BLD AUTO: 61 % (ref 42–75)
PLATELET # BLD AUTO: 282 X10^3/UL (ref 130–400)
PMV BLD AUTO: 6.6 FL (ref 7.4–10.4)
RBC # BLD AUTO: 4.58 X10^6/UL (ref 3.82–5.3)
VANCOMYCIN TROUGH SERPL-MCNC: < 1.7 MG/DL (ref 2.8–20)

## 2021-02-22 PROCEDURE — 81001 URINALYSIS AUTO W/SCOPE: CPT

## 2021-02-22 PROCEDURE — 80329 ANALGESICS NON-OPIOID 1 OR 2: CPT

## 2021-02-22 PROCEDURE — 80320 DRUG SCREEN QUANTALCOHOLS: CPT

## 2021-02-22 PROCEDURE — 36415 COLL VENOUS BLD VENIPUNCTURE: CPT

## 2021-02-22 PROCEDURE — 80299 QUANTITATIVE ASSAY DRUG: CPT

## 2021-02-22 PROCEDURE — G0480 DRUG TEST DEF 1-7 CLASSES: HCPCS

## 2021-02-22 PROCEDURE — 87077 CULTURE AEROBIC IDENTIFY: CPT

## 2021-02-22 PROCEDURE — 87086 URINE CULTURE/COLONY COUNT: CPT

## 2021-02-22 PROCEDURE — 80048 BASIC METABOLIC PNL TOTAL CA: CPT

## 2021-02-22 PROCEDURE — 82140 ASSAY OF AMMONIA: CPT

## 2021-02-22 PROCEDURE — 87426 SARSCOV CORONAVIRUS AG IA: CPT

## 2021-02-22 PROCEDURE — 87186 SC STD MICRODIL/AGAR DIL: CPT

## 2021-02-22 PROCEDURE — 99285 EMERGENCY DEPT VISIT HI MDM: CPT

## 2021-02-22 PROCEDURE — 80061 LIPID PANEL: CPT

## 2021-02-22 PROCEDURE — 84439 ASSAY OF FREE THYROXINE: CPT

## 2021-02-22 PROCEDURE — 84443 ASSAY THYROID STIM HORMONE: CPT

## 2021-02-22 PROCEDURE — 80307 DRUG TEST PRSMV CHEM ANLYZR: CPT

## 2021-02-22 PROCEDURE — 71045 X-RAY EXAM CHEST 1 VIEW: CPT

## 2021-02-22 PROCEDURE — 85025 COMPLETE CBC W/AUTO DIFF WBC: CPT

## 2021-02-22 PROCEDURE — 82040 ASSAY OF SERUM ALBUMIN: CPT

## 2021-02-22 RX ADMIN — ZIPRASIDONE HYDROCHLORIDE SCH MG: 40 CAPSULE ORAL at 22:00

## 2021-02-22 RX ADMIN — CARBAMAZEPINE SCH MG: 200 TABLET ORAL at 22:00

## 2021-02-23 VITALS — SYSTOLIC BLOOD PRESSURE: 117 MMHG | DIASTOLIC BLOOD PRESSURE: 76 MMHG

## 2021-02-23 LAB
CHOL/HDL RATIO: 1.7
HDL CHOL %: 59 % (ref 28–40)
HDL CHOLESTEROL (DIRECT): 91 MG/DL (ref 40–60)
LDL CHOLESTEROL,CALCULATED: 58 MG/DL (ref 54–169)
LDLC/HDLC SERPL: 0.6 {RATIO} (ref 0.5–3)
T4 FREE SERPL-MCNC: 1.02 NG/DL (ref 0.76–1.46)
TRIGL SERPL-MCNC: 26 MG/DL (ref 50–200)
VLDLC SERPL CALC-MCNC: 5 MG/DL (ref 0–25)

## 2021-02-23 RX ADMIN — CARBAMAZEPINE SCH MG: 200 TABLET ORAL at 08:38

## 2021-02-23 RX ADMIN — ZIPRASIDONE HYDROCHLORIDE SCH MG: 40 CAPSULE ORAL at 08:38

## 2021-02-23 RX ADMIN — CARBAMAZEPINE SCH MG: 200 TABLET ORAL at 19:50

## 2021-02-23 RX ADMIN — ZIPRASIDONE HYDROCHLORIDE SCH MG: 40 CAPSULE ORAL at 19:50

## 2021-02-24 VITALS — SYSTOLIC BLOOD PRESSURE: 121 MMHG | DIASTOLIC BLOOD PRESSURE: 64 MMHG

## 2021-02-24 VITALS — SYSTOLIC BLOOD PRESSURE: 137 MMHG | DIASTOLIC BLOOD PRESSURE: 86 MMHG

## 2021-02-24 LAB — MICROSCOPIC: (no result)

## 2021-02-24 RX ADMIN — ZIPRASIDONE HYDROCHLORIDE SCH MG: 40 CAPSULE ORAL at 20:58

## 2021-02-24 RX ADMIN — ZIPRASIDONE HYDROCHLORIDE SCH MG: 40 CAPSULE ORAL at 09:10

## 2021-02-24 RX ADMIN — CARBAMAZEPINE SCH MG: 200 TABLET ORAL at 20:58

## 2021-02-24 RX ADMIN — CARBAMAZEPINE SCH MG: 200 TABLET ORAL at 09:10

## 2021-02-25 VITALS — SYSTOLIC BLOOD PRESSURE: 122 MMHG | DIASTOLIC BLOOD PRESSURE: 77 MMHG

## 2021-02-25 VITALS — SYSTOLIC BLOOD PRESSURE: 110 MMHG | DIASTOLIC BLOOD PRESSURE: 71 MMHG

## 2021-02-25 RX ADMIN — CEPHALEXIN SCH MG: 500 CAPSULE ORAL at 17:30

## 2021-02-25 RX ADMIN — CARBAMAZEPINE SCH MG: 200 TABLET ORAL at 09:47

## 2021-02-25 RX ADMIN — ZIPRASIDONE HYDROCHLORIDE SCH MG: 40 CAPSULE ORAL at 09:47

## 2021-02-25 RX ADMIN — CARBAMAZEPINE SCH MG: 200 TABLET ORAL at 14:25

## 2021-02-25 RX ADMIN — ZIPRASIDONE HYDROCHLORIDE SCH MG: 40 CAPSULE ORAL at 14:24

## 2021-02-25 RX ADMIN — CEPHALEXIN SCH MG: 500 CAPSULE ORAL at 20:23

## 2021-02-25 RX ADMIN — CARBAMAZEPINE SCH MG: 200 TABLET ORAL at 20:23

## 2021-02-26 VITALS — SYSTOLIC BLOOD PRESSURE: 115 MMHG | DIASTOLIC BLOOD PRESSURE: 78 MMHG

## 2021-02-26 VITALS — SYSTOLIC BLOOD PRESSURE: 127 MMHG | DIASTOLIC BLOOD PRESSURE: 76 MMHG

## 2021-02-26 RX ADMIN — CARBAMAZEPINE SCH MG: 200 TABLET ORAL at 09:32

## 2021-02-26 RX ADMIN — CEPHALEXIN SCH MG: 500 CAPSULE ORAL at 09:32

## 2021-02-26 RX ADMIN — ZIPRASIDONE HYDROCHLORIDE SCH MG: 40 CAPSULE ORAL at 09:32

## 2021-02-26 RX ADMIN — CEPHALEXIN SCH MG: 500 CAPSULE ORAL at 20:21

## 2021-02-26 RX ADMIN — CARBAMAZEPINE SCH MG: 200 TABLET ORAL at 20:21

## 2021-02-26 RX ADMIN — CEPHALEXIN SCH MG: 500 CAPSULE ORAL at 06:26

## 2021-02-26 RX ADMIN — ZIPRASIDONE HYDROCHLORIDE SCH MG: 40 CAPSULE ORAL at 20:20

## 2021-02-26 RX ADMIN — CEPHALEXIN SCH MG: 500 CAPSULE ORAL at 16:23

## 2021-02-27 VITALS — DIASTOLIC BLOOD PRESSURE: 79 MMHG | SYSTOLIC BLOOD PRESSURE: 116 MMHG

## 2021-02-27 VITALS — SYSTOLIC BLOOD PRESSURE: 114 MMHG | DIASTOLIC BLOOD PRESSURE: 77 MMHG

## 2021-02-27 RX ADMIN — CARBAMAZEPINE SCH MG: 200 TABLET ORAL at 20:37

## 2021-02-27 RX ADMIN — CARBAMAZEPINE SCH MG: 200 TABLET ORAL at 09:15

## 2021-02-27 RX ADMIN — CEPHALEXIN SCH MG: 500 CAPSULE ORAL at 06:10

## 2021-02-27 RX ADMIN — ZIPRASIDONE HYDROCHLORIDE SCH MG: 40 CAPSULE ORAL at 20:37

## 2021-02-27 RX ADMIN — CEPHALEXIN SCH MG: 500 CAPSULE ORAL at 11:27

## 2021-02-27 RX ADMIN — ZIPRASIDONE HYDROCHLORIDE SCH MG: 40 CAPSULE ORAL at 09:15

## 2021-02-27 RX ADMIN — CEPHALEXIN SCH MG: 500 CAPSULE ORAL at 20:37

## 2021-02-27 RX ADMIN — CEPHALEXIN SCH MG: 500 CAPSULE ORAL at 16:14

## 2021-02-28 VITALS — DIASTOLIC BLOOD PRESSURE: 76 MMHG | SYSTOLIC BLOOD PRESSURE: 118 MMHG

## 2021-02-28 RX ADMIN — CARBAMAZEPINE SCH MG: 200 TABLET ORAL at 08:36

## 2021-02-28 RX ADMIN — ZIPRASIDONE HYDROCHLORIDE SCH MG: 40 CAPSULE ORAL at 08:36

## 2021-02-28 RX ADMIN — CEPHALEXIN SCH MG: 500 CAPSULE ORAL at 11:10

## 2021-02-28 RX ADMIN — CEPHALEXIN SCH MG: 500 CAPSULE ORAL at 05:58

## 2021-04-05 ENCOUNTER — OFFICE VISIT (OUTPATIENT)
Dept: URGENT CARE | Facility: CLINIC | Age: 56
End: 2021-04-05
Payer: MEDICAID

## 2021-04-05 VITALS
SYSTOLIC BLOOD PRESSURE: 108 MMHG | BODY MASS INDEX: 22.5 KG/M2 | DIASTOLIC BLOOD PRESSURE: 70 MMHG | HEIGHT: 66 IN | HEART RATE: 77 BPM | WEIGHT: 140 LBS | OXYGEN SATURATION: 97 % | RESPIRATION RATE: 16 BRPM | TEMPERATURE: 97.1 F

## 2021-04-05 DIAGNOSIS — J02.0 ACUTE STREPTOCOCCAL PHARYNGITIS: ICD-10-CM

## 2021-04-05 PROBLEM — F15.10 STIMULANT ABUSE (HCC): Status: ACTIVE | Noted: 2021-02-01

## 2021-04-05 PROBLEM — F33.1 MAJOR DEPRESSIVE DISORDER, RECURRENT EPISODE, MODERATE (HCC): Status: ACTIVE | Noted: 2020-11-10

## 2021-04-05 PROBLEM — F20.9 SCHIZOPHRENIA, CHRONIC CONDITION (HCC): Status: ACTIVE | Noted: 2021-02-01

## 2021-04-05 LAB
INT CON NEG: NEGATIVE
INT CON POS: POSITIVE
S PYO AG THROAT QL: POSITIVE

## 2021-04-05 PROCEDURE — 99204 OFFICE O/P NEW MOD 45 MIN: CPT | Performed by: NURSE PRACTITIONER

## 2021-04-05 PROCEDURE — 87880 STREP A ASSAY W/OPTIC: CPT | Performed by: NURSE PRACTITIONER

## 2021-04-05 RX ORDER — AMOXICILLIN 500 MG/1
500 CAPSULE ORAL 2 TIMES DAILY
Qty: 20 CAPSULE | Refills: 0 | Status: SHIPPED | OUTPATIENT
Start: 2021-04-05 | End: 2021-04-15

## 2021-04-05 RX ORDER — PRAZOSIN HYDROCHLORIDE 1 MG/1
CAPSULE ORAL
COMMUNITY
Start: 2021-02-01

## 2021-04-05 RX ORDER — QUETIAPINE FUMARATE 50 MG/1
TABLET, FILM COATED ORAL
COMMUNITY
Start: 2021-02-01

## 2021-04-05 NOTE — PROGRESS NOTES
Chief Complaint   Patient presents with   • Pharyngitis     X 10 DAYS        HISTORY OF PRESENT ILLNESS: Patient is a pleasant 56 y.o. female who presents today due to complaints of a sore throat for the past 10 days. Reports associated pain with swallowing, malaise, fatigue. Pain is moderate. Denies associated fever, chills, rash, chest pain, urinary complaints, congestion, cough, or difficulty breathing. She has tried OTC medications at home without much improvement. Denies known ill contacts. Denies known exposure to COVID-19.       Patient Active Problem List    Diagnosis Date Noted   • Schizophrenia, chronic condition ([facility]) 02/01/2021   • Stimulant abuse ([facility]) 02/01/2021   • Major depressive disorder, recurrent episode, moderate ([facility]) 11/10/2020       Allergies:Sulfa drugs    Current Outpatient Medications Ordered in Epic   Medication Sig Dispense Refill   • TRAZODONE HCL PO Take  by mouth.     • ALBUTEROL INH Inhale. NIGHTLY     • prazosin (MINIPRESS) 1 MG Cap PRAZOSIN HCL 1 MG CAPS     • QUEtiapine (SEROQUEL) 50 MG tablet QUETIAPINE FUMARATE 50 MG TABS     • amoxicillin (AMOXIL) 500 MG Cap Take 1 capsule by mouth 2 times a day for 10 days. 20 capsule 0     No current Epic-ordered facility-administered medications on file.       Past Medical History:   Diagnosis Date   • Hypertension    • Psychiatric disorder        Social History     Tobacco Use   • Smoking status: Current Every Day Smoker     Packs/day: 0.50   • Smokeless tobacco: Never Used   Substance Use Topics   • Alcohol use: Yes     Comment: 2 drinks per day   • Drug use: Yes     Comment: meth, marijuana       No family status information on file.   History reviewed. No pertinent family history.    ROS:  Review of Systems   Constitutional: Positive for malaise, fatigue. Negative for fever, chills.   HENT: Positive for sore throat. Negative for ear pain, nosebleeds, congestion.   Eyes: Negative for vision changes.   Cardiovascular: Negative for chest  "pain, palpitations, orthopnea and leg swelling.   Respiratory: Negative for cough, sputum production, shortness of breath and wheezing.   Gastrointestinal: Negative for abdominal pain, nausea, vomiting or diarrhea.   : Negative for changes in urination.   Skin: Negative for rash, diaphoresis.     Exam:  /70   Pulse 77   Temp 36.2 °C (97.1 °F) (Temporal)   Resp 16   Ht 1.676 m (5' 6\")   Wt 63.5 kg (140 lb)   SpO2 97%   General: well-nourished, well-developed female in NAD  Head: normocephalic, atraumatic  Eyes: PERRLA, no conjunctival injection, acuity grossly intact, lids normal.  Ears: normal shape and symmetry, no tenderness, no discharge. External canals are without any significant edema or erythema. Tympanic membranes are without any inflammation, no effusion. Gross auditory acuity is intact.  Nose: symmetrical without tenderness, no discharge.  Mouth/Throat: reasonable hygiene. There is erythema, with exudates and tonsillar enlargement present.  Neck: no masses, range of motion within normal limits, no tracheal deviation. No obvious thyroid enlargement.   Lymph: bilateral anterior cervical adenopathy, R>L. No supraclavicular adenopathy.   Neuro: alert and oriented. Cranial nerves 1-12 grossly intact. No sensory deficit.   Cardiovascular: regular rate and rhythm. No edema.  Pulmonary: no distress. Chest is symmetrical with respiration, no wheezes, crackles, or rhonchi.   Musculoskeletal: no clubbing, appropriate muscle tone, gait is stable.  Skin: warm, dry, intact, no clubbing, no cyanosis, no rashes.   Psych: appropriate mood, affect, judgement.       POC strep positive      Assessment/Plan:  1. Acute streptococcal pharyngitis  POCT Rapid Strep A    amoxicillin (AMOXIL) 500 MG Cap           Antibiotic as directed. OTC tylenol for pain/fever control. Hand hygiene. Increase fluid intake, rest. Warm salt water gargles.   Supportive care, differential diagnoses, and indications for immediate " follow-up discussed with patient.   Pathogenesis of diagnosis discussed including typical length and natural progression.   Instructed to return to clinic or nearest emergency department for any change in condition, further concerns, or worsening of symptoms.  Patient states understanding of the plan of care and discharge instructions.  Instructed to make an appointment, for follow up, with her primary care provider.        Please note that this dictation was created using voice recognition software. I have made every reasonable attempt to correct obvious errors, but I expect that there are errors of grammar and possibly content that I did not discover before finalizing the note. N95 and safety glasses used for entire visit.       GHAZALA Spears.

## 2021-05-17 ENCOUNTER — HOSPITAL ENCOUNTER (EMERGENCY)
Facility: MEDICAL CENTER | Age: 56
End: 2021-05-17
Attending: EMERGENCY MEDICINE
Payer: MEDICAID

## 2021-05-17 VITALS
SYSTOLIC BLOOD PRESSURE: 124 MMHG | WEIGHT: 150 LBS | HEIGHT: 65 IN | HEART RATE: 86 BPM | DIASTOLIC BLOOD PRESSURE: 86 MMHG | TEMPERATURE: 99.7 F | BODY MASS INDEX: 24.99 KG/M2 | RESPIRATION RATE: 19 BRPM | OXYGEN SATURATION: 98 %

## 2021-05-17 DIAGNOSIS — F19.959 DRUG-INDUCED PSYCHOTIC DISORDER WITH COMPLICATION (HCC): ICD-10-CM

## 2021-05-17 DIAGNOSIS — F15.10 METHAMPHETAMINE ABUSE (HCC): ICD-10-CM

## 2021-05-17 LAB
ALBUMIN SERPL BCP-MCNC: 4.3 G/DL (ref 3.2–4.9)
ALBUMIN/GLOB SERPL: 1.3 G/DL
ALP SERPL-CCNC: 104 U/L (ref 30–99)
ALT SERPL-CCNC: 44 U/L (ref 2–50)
ANION GAP SERPL CALC-SCNC: 12 MMOL/L (ref 7–16)
ANISOCYTOSIS BLD QL SMEAR: ABNORMAL
AST SERPL-CCNC: 60 U/L (ref 12–45)
BASOPHILS # BLD AUTO: 0.9 % (ref 0–1.8)
BASOPHILS # BLD: 0.11 K/UL (ref 0–0.12)
BILIRUB SERPL-MCNC: 0.5 MG/DL (ref 0.1–1.5)
BUN SERPL-MCNC: 12 MG/DL (ref 8–22)
CALCIUM SERPL-MCNC: 9.1 MG/DL (ref 8.5–10.5)
CHLORIDE SERPL-SCNC: 102 MMOL/L (ref 96–112)
CO2 SERPL-SCNC: 24 MMOL/L (ref 20–33)
CREAT SERPL-MCNC: 0.95 MG/DL (ref 0.5–1.4)
EOSINOPHIL # BLD AUTO: 0 K/UL (ref 0–0.51)
EOSINOPHIL NFR BLD: 0 % (ref 0–6.9)
ERYTHROCYTE [DISTWIDTH] IN BLOOD BY AUTOMATED COUNT: 50.1 FL (ref 35.9–50)
ETHANOL BLD-MCNC: <10.1 MG/DL (ref 0–10)
GLOBULIN SER CALC-MCNC: 3.2 G/DL (ref 1.9–3.5)
GLUCOSE SERPL-MCNC: 80 MG/DL (ref 65–99)
HCT VFR BLD AUTO: 35 % (ref 37–47)
HGB BLD-MCNC: 11.1 G/DL (ref 12–16)
LYMPHOCYTES # BLD AUTO: 3.36 K/UL (ref 1–4.8)
LYMPHOCYTES NFR BLD: 26.7 % (ref 22–41)
MANUAL DIFF BLD: NORMAL
MCH RBC QN AUTO: 27.2 PG (ref 27–33)
MCHC RBC AUTO-ENTMCNC: 31.7 G/DL (ref 33.6–35)
MCV RBC AUTO: 85.8 FL (ref 81.4–97.8)
MONOCYTES # BLD AUTO: 0.87 K/UL (ref 0–0.85)
MONOCYTES NFR BLD AUTO: 6.9 % (ref 0–13.4)
MORPHOLOGY BLD-IMP: NORMAL
NEUTROPHILS # BLD AUTO: 8.25 K/UL (ref 2–7.15)
NEUTROPHILS NFR BLD: 65.5 % (ref 44–72)
NRBC # BLD AUTO: 0 K/UL
NRBC BLD-RTO: 0 /100 WBC
PLATELET # BLD AUTO: 308 K/UL (ref 164–446)
PLATELET BLD QL SMEAR: NORMAL
PMV BLD AUTO: 8.8 FL (ref 9–12.9)
POTASSIUM SERPL-SCNC: 3.4 MMOL/L (ref 3.6–5.5)
PROT SERPL-MCNC: 7.5 G/DL (ref 6–8.2)
RBC # BLD AUTO: 4.08 M/UL (ref 4.2–5.4)
RBC BLD AUTO: PRESENT
SODIUM SERPL-SCNC: 138 MMOL/L (ref 135–145)
WBC # BLD AUTO: 12.6 K/UL (ref 4.8–10.8)

## 2021-05-17 PROCEDURE — 85007 BL SMEAR W/DIFF WBC COUNT: CPT

## 2021-05-17 PROCEDURE — 82077 ASSAY SPEC XCP UR&BREATH IA: CPT

## 2021-05-17 PROCEDURE — 700111 HCHG RX REV CODE 636 W/ 250 OVERRIDE (IP): Performed by: EMERGENCY MEDICINE

## 2021-05-17 PROCEDURE — 80053 COMPREHEN METABOLIC PANEL: CPT

## 2021-05-17 PROCEDURE — 85027 COMPLETE CBC AUTOMATED: CPT

## 2021-05-17 PROCEDURE — 96372 THER/PROPH/DIAG INJ SC/IM: CPT | Mod: XU

## 2021-05-17 PROCEDURE — 96374 THER/PROPH/DIAG INJ IV PUSH: CPT

## 2021-05-17 PROCEDURE — 99285 EMERGENCY DEPT VISIT HI MDM: CPT

## 2021-05-17 RX ORDER — HALOPERIDOL 5 MG/ML
5 INJECTION INTRAMUSCULAR ONCE
Status: COMPLETED | OUTPATIENT
Start: 2021-05-17 | End: 2021-05-17

## 2021-05-17 RX ORDER — DIPHENHYDRAMINE HYDROCHLORIDE 50 MG/ML
25 INJECTION INTRAMUSCULAR; INTRAVENOUS ONCE
Status: COMPLETED | OUTPATIENT
Start: 2021-05-17 | End: 2021-05-17

## 2021-05-17 RX ADMIN — DIPHENHYDRAMINE HYDROCHLORIDE 25 MG: 50 INJECTION, SOLUTION INTRAMUSCULAR; INTRAVENOUS at 13:15

## 2021-05-17 RX ADMIN — HALOPERIDOL LACTATE 5 MG: 5 INJECTION, SOLUTION INTRAMUSCULAR at 14:24

## 2021-05-17 NOTE — ED TRIAGE NOTES
"..  Chief Complaint   Patient presents with   • Medication Refill     pt BIB EMS after her  called. the pt reports she has been sober off Meth for 90 days but smoked some last night or today. Pt has no complaints but does need a prescription for her abilify. pt denies SI and HI    • Drug Abuse       ..Ht 1.651 m (5' 5\")   Wt 68 kg (150 lb)   "

## 2021-05-17 NOTE — ED NOTES
Patient requested and was given some juice, ice, a pillow and a sheet.  Assisted patient with her gown

## 2021-05-17 NOTE — ED PROVIDER NOTES
"ED Provider  Scribed for Sky Constantino D.O. by Melissa Capps. 5/17/2021  12:38 PM    Means of arrival:EMS  History obtained from:patient  History limited by: none    CHIEF COMPLAINT  Chief Complaint   Patient presents with    Medication Refill     pt BIB EMS after her  called. the pt reports she has been sober off Meth for 90 days but smoked some last night or today. Pt has no complaints but does need a prescription for her abilify. pt denies SI and HI     Drug Abuse       HPI  Elissa Emmanuel is a 56 y.o. female who presents via EMS for drug abuse and medication refill. She has been sober off methamphetamine for the past 3 months but smoked it last night. She reports associated nausea and anxiety. Patient also needs a refill on her prescription for Abilify.     REVIEW OF SYSTEMS  See HPI for further details. All other systems are negative.     PAST MEDICAL HISTORY   has a past medical history of Hypertension and Psychiatric disorder.    SOCIAL HISTORY  Social History     Tobacco Use    Smoking status: Current Every Day Smoker     Packs/day: 0.50    Smokeless tobacco: Never Used   Substance and Sexual Activity    Alcohol use: Yes     Comment: 2 drinks per day    Drug use: Yes     Comment: meth, marijuana    Sexual activity: Not reported       SURGICAL HISTORY  patient denies any surgical history    CURRENT MEDICATIONS  Home Medications       Reviewed by Indy Charles R.N. (Registered Nurse) on 05/17/21 at 1231  Med List Status: Not Addressed     Medication Last Dose Status   ALBUTEROL INH  Active   prazosin (MINIPRESS) 1 MG Cap  Active   QUEtiapine (SEROQUEL) 50 MG tablet  Active   TRAZODONE HCL PO  Active                    ALLERGIES  Allergies   Allergen Reactions    Sulfa Drugs      Sulf based anitbx       PHYSICAL EXAM  VITAL SIGNS: /86   Pulse (!) 115   Temp 37.1 °C (98.7 °F) (Temporal)   Resp 18   Ht 1.651 m (5' 5\")   Wt 68 kg (150 lb)   SpO2 95%   BMI 24.96 kg/m² "   Constitutional: Alert in no apparent distress. No signs of injury.  HENT: No signs of trauma, mucous membranes are moist  Eyes: Conjunctiva normal, Non-icteric.   Neck: Normal range of motion, No tenderness, Supple.  Lymphatic: No lymphadenopathy noted.   Cardiovascular: Tachycardic rate and rhythm, no murmurs.   Thorax & Lungs: Normal breath sounds, No respiratory distress, No wheezing, No chest tenderness.   Abdomen: Bowel sounds normal, Soft, No tenderness, No masses, No pulsatile masses. No peritoneal signs.  Skin: Warm, Dry, normal color.   Back: No bony tenderness, No CVA tenderness.   Extremities: No edema, No tenderness, No cyanosis  Musculoskeletal: Good range of motion in all major joints. No tenderness to palpation or major deformities noted.   Neurologic: Alert and oriented x4, Normal motor function, Normal sensory function, No focal deficits noted.   Psychiatric: Anxious, extremely animated, conversations are rambling and patient is unable to focus on the question at hand.     DIAGNOSTIC STUDIES / PROCEDURES    LABS  Results for orders placed or performed during the hospital encounter of 05/17/21   CBC WITH DIFFERENTIAL   Result Value Ref Range    WBC 12.6 (H) 4.8 - 10.8 K/uL    RBC 4.08 (L) 4.20 - 5.40 M/uL    Hemoglobin 11.1 (L) 12.0 - 16.0 g/dL    Hematocrit 35.0 (L) 37.0 - 47.0 %    MCV 85.8 81.4 - 97.8 fL    MCH 27.2 27.0 - 33.0 pg    MCHC 31.7 (L) 33.6 - 35.0 g/dL    RDW 50.1 (H) 35.9 - 50.0 fL    Platelet Count 308 164 - 446 K/uL    MPV 8.8 (L) 9.0 - 12.9 fL    Neutrophils-Polys 65.50 44.00 - 72.00 %    Lymphocytes 26.70 22.00 - 41.00 %    Monocytes 6.90 0.00 - 13.40 %    Eosinophils 0.00 0.00 - 6.90 %    Basophils 0.90 0.00 - 1.80 %    Nucleated RBC 0.00 /100 WBC    Neutrophils (Absolute) 8.25 (H) 2.00 - 7.15 K/uL    Lymphs (Absolute) 3.36 1.00 - 4.80 K/uL    Monos (Absolute) 0.87 (H) 0.00 - 0.85 K/uL    Eos (Absolute) 0.00 0.00 - 0.51 K/uL    Baso (Absolute) 0.11 0.00 - 0.12 K/uL    NRBC  (Absolute) 0.00 K/uL    Anisocytosis 1+    COMP METABOLIC PANEL   Result Value Ref Range    Sodium 138 135 - 145 mmol/L    Potassium 3.4 (L) 3.6 - 5.5 mmol/L    Chloride 102 96 - 112 mmol/L    Co2 24 20 - 33 mmol/L    Anion Gap 12.0 7.0 - 16.0    Glucose 80 65 - 99 mg/dL    Bun 12 8 - 22 mg/dL    Creatinine 0.95 0.50 - 1.40 mg/dL    Calcium 9.1 8.5 - 10.5 mg/dL    AST(SGOT) 60 (H) 12 - 45 U/L    ALT(SGPT) 44 2 - 50 U/L    Alkaline Phosphatase 104 (H) 30 - 99 U/L    Total Bilirubin 0.5 0.1 - 1.5 mg/dL    Albumin 4.3 3.2 - 4.9 g/dL    Total Protein 7.5 6.0 - 8.2 g/dL    Globulin 3.2 1.9 - 3.5 g/dL    A-G Ratio 1.3 g/dL   DIAGNOSTIC ALCOHOL   Result Value Ref Range    Diagnostic Alcohol <10.1 0.0 - 10.0 mg/dL   ESTIMATED GFR   Result Value Ref Range    GFR If African American >60 >60 mL/min/1.73 m 2    GFR If Non African American >60 >60 mL/min/1.73 m 2   DIFFERENTIAL MANUAL   Result Value Ref Range    Manual Diff Status PERFORMED    PERIPHERAL SMEAR REVIEW   Result Value Ref Range    Peripheral Smear Review see below    PLATELET ESTIMATE   Result Value Ref Range    Plt Estimation Normal    MORPHOLOGY   Result Value Ref Range    RBC Morphology Present       All labs reviewed by me.    COURSE  Pertinent Labs & Imaging studies reviewed. (See chart for details)    Review of past medical records shows the patient's last ER visit was in 12/2018. Records showed history of methamphetamine use at that time.     12:38 PM - Patient seen and examined at bedside. Discussed plan of care. The patient will be medicated with Haldol 5 mg and Benadryl 25 mg. Ordered for diagnostic alcohol, CBC with diff, and CMP to evaluate her symptoms.     4:24 PM - Patient reevaluated at bedside and is sleeping but is easily aroused. Patient won't answer questions.     6:34 PM - Patient reevaluated at bedside and is sleeping but is easily aroused. She is oriented x3. She is not suicidal or homicidal.     MEDICAL DECISION MAKING  This is a 56 y.o.  female who presents with what appears to be methamphetamine intoxication.  Her speech is rambling, she is extremely animated.  She denies suicidal or homicidal ideation she does admit to anxiety.  She is medicated with Haldol and had good results.  The patient was somnolent for sleeping for period of time.  On waking she is more calm cooperative.  Her speech is coherent and she is oriented x3.  She is not suicidal or homicidal she is discharged home to follow-up with her primary doctor to get appropriate medication refills      The patient will return for new or worsening symptoms and is stable at the time of discharge.    The patient is referred to a primary physician for blood pressure management, diabetic screening, and for all other preventative health concerns.    DISPOSITION:  Patient will be discharged home in stable condition.    FOLLOW UP:  [facility] [address] [place] [address] [phone]  In 1 day      FINAL IMPRESSION  1. Drug-induced psychotic disorder with complication (HCC)    2. Methamphetamine abuse (HCC)         Melissa GUAJARDO (Jitendra), am scribing for, and in the presence of, Sky Constantino D.O..    Electronically signed by: Melissa Capps (Jitendra), 5/17/2021    Sky GUAJARDO D.O. personally performed the services described in this documentation, as scribed by Melissa Capps in my presence, and it is both accurate and complete. C    The note accurately reflects work and decisions made by me.  Sky Constantino D.O.  5/17/2021  9:08 PM

## 2021-05-18 NOTE — DISCHARGE INSTRUCTIONS
Stop using methamphetamines.  Please follow-up with [facility], [facility] or travel where you have received care in the past.

## 2021-05-18 NOTE — ED NOTES
Pt is sleeping, difficult to arouse. resp e/u.  Pt is calm, cooperative. No cyanosis/pallor. Skin warm, dry, and nml color. No apparent distress note. Denies pain and discomfort at this time. Moves all extremities/feels all extremities. 2+ pulsesx4. Vss. Will continue to monitor.

## 2023-03-05 NOTE — ED NOTES
SW called   [FreeTextEntry1] : This note was authored by Stefanie Seaman working as a scribe for Dr.Gary Rivera. I, Dr. Daniel Rivera have reviewed the content of this note and confirm it is true and accurate. I personally performed the history and physical examination and made all the decisions 03/02/2023

## 2024-07-03 ENCOUNTER — APPOINTMENT (OUTPATIENT)
Dept: URGENT CARE | Facility: PHYSICIAN GROUP | Age: 59
End: 2024-07-03